# Patient Record
Sex: FEMALE | Race: AMERICAN INDIAN OR ALASKA NATIVE
[De-identification: names, ages, dates, MRNs, and addresses within clinical notes are randomized per-mention and may not be internally consistent; named-entity substitution may affect disease eponyms.]

---

## 2017-07-13 ENCOUNTER — HOSPITAL ENCOUNTER (OUTPATIENT)
Dept: HOSPITAL 5 - GIO | Age: 52
Discharge: HOME | End: 2017-07-13
Attending: INTERNAL MEDICINE
Payer: COMMERCIAL

## 2017-07-13 VITALS — SYSTOLIC BLOOD PRESSURE: 168 MMHG | DIASTOLIC BLOOD PRESSURE: 90 MMHG

## 2017-07-13 DIAGNOSIS — D12.5: ICD-10-CM

## 2017-07-13 DIAGNOSIS — Z12.11: Primary | ICD-10-CM

## 2017-07-13 DIAGNOSIS — K64.8: ICD-10-CM

## 2017-07-13 DIAGNOSIS — Z98.890: ICD-10-CM

## 2017-07-13 DIAGNOSIS — Z79.899: ICD-10-CM

## 2017-07-13 DIAGNOSIS — I10: ICD-10-CM

## 2017-07-13 DIAGNOSIS — E66.01: ICD-10-CM

## 2017-07-13 DIAGNOSIS — Z90.710: ICD-10-CM

## 2017-07-13 PROCEDURE — 45385 COLONOSCOPY W/LESION REMOVAL: CPT

## 2017-07-13 PROCEDURE — 88305 TISSUE EXAM BY PATHOLOGIST: CPT

## 2017-07-13 NOTE — ANESTHESIA CONSULTATION
Anesthesia Consult and Med Hx


Date of service: 07/13/17





- Airway


Anesthetic Teeth Evaluation: Good, Crowns (BOTTOM RIGHT)


ROM Head & Neck: Adequate


Mental/Hyoid Distance: Adequate


Mallampati Class: Class II


Intubation Access Assessment: Probably Good





- Pulmonary Exam


CTA: Yes





- Cardiac Exam


Cardiac Exam: RRR





- Pre-Operative Health Status


ASA Pre-Surgery Classification: ASA3


Proposed Anesthetic Plan: MAC





- Pulmonary


Hx Smoking: No


Hx Asthma: No


Hx Sleep Apnea: No





- Cardiovascular System


Hx Hypertension: Yes





- Central Nervous System


Hx Seizures: No


CVA: No





- Endocrine


Hx Renal Disease: No


Hx Liver Disease: No


Hx Thyroid Disease: No





- Other Systems


Hx Obesity: Yes (MORBID)

## 2017-07-13 NOTE — SHORT STAY SUMMARY
Short Stay Documentation





- Allergies and Medications


Current Medications: 


 Allergies





No Known Allergies Allergy (Verified 07/13/17 08:17)


 





 Home Medications











 Medication  Instructions  Recorded  Confirmed  Last Taken  Type


 


Hydrochlorothiazide 1 tab PO DAILY 07/12/17 07/13/17 07/13/17 History


 


Lisinopril 1 tab PO DAILY 07/12/17 07/13/17 07/13/17 History


 


Verapamil 1 tab PO DAILY 07/12/17 07/13/17 07/13/17 History








Active Medications





Sodium Chloride (Nacl 0.9% 1000 Ml)  1,000 mls @ 50 mls/hr IV AS DIRECT MICHAEL


   Last Admin: 07/13/17 08:18 Dose:  50 mls/hr











- Physical exam


Rectal Exam: tenderness





- Brief post op/procedure progress note


Date of procedure: 07/13/17


Pre-op diagnosis: Colon cancer screening


Post-op diagnosis: same (1. Colon polyp 2. Poor prep  2. Internal hemorrhoids)


Procedure: 


 Colonoscopy with snare polypectomy and control of bleeding with Endoclip





Anesthesia: MAC


Findings: 





as above


Surgeon: DWAYNE BRYSON


Estimated blood loss: none


Pathology: list (1Rectosigmoid polyp)


Condition: stable





- Disposition


Condition at discharge: Stable


Disposition: DC-01 TO HOME OR SELFCARE





Short Stay Discharge Plan


Activity: no restrictions


Weight Bearing Status: Full Weight Bearing


Diet: regular


Follow up with: 


JEIMY SANTA III, APRN-BC [Primary Care Provider] - 7 Days

## 2018-02-01 ENCOUNTER — HOSPITAL ENCOUNTER (INPATIENT)
Dept: HOSPITAL 5 - ED | Age: 53
LOS: 1 days | Discharge: HOME | DRG: 311 | End: 2018-02-02
Attending: INTERNAL MEDICINE | Admitting: INTERNAL MEDICINE
Payer: COMMERCIAL

## 2018-02-01 DIAGNOSIS — I24.9: Primary | ICD-10-CM

## 2018-02-01 DIAGNOSIS — Z82.49: ICD-10-CM

## 2018-02-01 DIAGNOSIS — E66.9: ICD-10-CM

## 2018-02-01 DIAGNOSIS — J45.909: ICD-10-CM

## 2018-02-01 DIAGNOSIS — I10: ICD-10-CM

## 2018-02-01 LAB
ALBUMIN SERPL-MCNC: 4 G/DL (ref 3.9–5)
ALT SERPL-CCNC: 25 UNITS/L (ref 7–56)
APTT BLD: 25.7 SEC. (ref 24.2–36.6)
BASOPHILS # (AUTO): 0 K/MM3 (ref 0–0.1)
BASOPHILS # (AUTO): 0.1 K/MM3 (ref 0–0.1)
BASOPHILS NFR BLD AUTO: 0.3 % (ref 0–1.8)
BASOPHILS NFR BLD AUTO: 0.5 % (ref 0–1.8)
BILIRUB DIRECT SERPL-MCNC: 0.4 MG/DL (ref 0–0.2)
BUN SERPL-MCNC: 11 MG/DL (ref 7–17)
BUN SERPL-MCNC: 13 MG/DL (ref 7–17)
BUN/CREAT SERPL: 18 %
BUN/CREAT SERPL: 22 %
CALCIUM SERPL-MCNC: 8.7 MG/DL (ref 8.4–10.2)
CALCIUM SERPL-MCNC: 9.4 MG/DL (ref 8.4–10.2)
EOSINOPHIL # BLD AUTO: 0 K/MM3 (ref 0–0.4)
EOSINOPHIL # BLD AUTO: 0.1 K/MM3 (ref 0–0.4)
EOSINOPHIL NFR BLD AUTO: 0.4 % (ref 0–4.3)
EOSINOPHIL NFR BLD AUTO: 1.1 % (ref 0–4.3)
HCT VFR BLD CALC: 39.9 % (ref 30.3–42.9)
HCT VFR BLD CALC: 41.3 % (ref 30.3–42.9)
HDLC SERPL-MCNC: 53 MG/DL (ref 40–59)
HEMOLYSIS INDEX: 10
HEMOLYSIS INDEX: 28
HGB BLD-MCNC: 12.7 GM/DL (ref 10.1–14.3)
HGB BLD-MCNC: 13.3 GM/DL (ref 10.1–14.3)
INR PPP: 0.9 (ref 0.87–1.13)
LYMPHOCYTES # BLD AUTO: 1.9 K/MM3 (ref 1.2–5.4)
LYMPHOCYTES # BLD AUTO: 2.2 K/MM3 (ref 1.2–5.4)
LYMPHOCYTES NFR BLD AUTO: 18.4 % (ref 13.4–35)
LYMPHOCYTES NFR BLD AUTO: 24.1 % (ref 13.4–35)
MAGNESIUM SERPL-MCNC: 1.8 MG/DL (ref 1.7–2.3)
MCH RBC QN AUTO: 27 PG (ref 28–32)
MCH RBC QN AUTO: 27 PG (ref 28–32)
MCHC RBC AUTO-ENTMCNC: 32 % (ref 30–34)
MCHC RBC AUTO-ENTMCNC: 32 % (ref 30–34)
MCV RBC AUTO: 84 FL (ref 79–97)
MCV RBC AUTO: 84 FL (ref 79–97)
MONOCYTES # (AUTO): 0.6 K/MM3 (ref 0–0.8)
MONOCYTES # (AUTO): 0.7 K/MM3 (ref 0–0.8)
MONOCYTES % (AUTO): 6.2 % (ref 0–7.3)
MONOCYTES % (AUTO): 6.3 % (ref 0–7.3)
PLATELET # BLD: 263 K/MM3 (ref 140–440)
PLATELET # BLD: 272 K/MM3 (ref 140–440)
RBC # BLD AUTO: 4.76 M/MM3 (ref 3.65–5.03)
RBC # BLD AUTO: 4.89 M/MM3 (ref 3.65–5.03)

## 2018-02-01 PROCEDURE — A9502 TC99M TETROFOSMIN: HCPCS

## 2018-02-01 PROCEDURE — 83735 ASSAY OF MAGNESIUM: CPT

## 2018-02-01 PROCEDURE — 93010 ELECTROCARDIOGRAM REPORT: CPT

## 2018-02-01 PROCEDURE — 84484 ASSAY OF TROPONIN QUANT: CPT

## 2018-02-01 PROCEDURE — 78452 HT MUSCLE IMAGE SPECT MULT: CPT

## 2018-02-01 PROCEDURE — 71045 X-RAY EXAM CHEST 1 VIEW: CPT

## 2018-02-01 PROCEDURE — 85610 PROTHROMBIN TIME: CPT

## 2018-02-01 PROCEDURE — 93306 TTE W/DOPPLER COMPLETE: CPT

## 2018-02-01 PROCEDURE — 80074 ACUTE HEPATITIS PANEL: CPT

## 2018-02-01 PROCEDURE — 85730 THROMBOPLASTIN TIME PARTIAL: CPT

## 2018-02-01 PROCEDURE — 80061 LIPID PANEL: CPT

## 2018-02-01 PROCEDURE — 93005 ELECTROCARDIOGRAM TRACING: CPT

## 2018-02-01 PROCEDURE — 80048 BASIC METABOLIC PNL TOTAL CA: CPT

## 2018-02-01 PROCEDURE — 83880 ASSAY OF NATRIURETIC PEPTIDE: CPT

## 2018-02-01 PROCEDURE — 85025 COMPLETE CBC W/AUTO DIFF WBC: CPT

## 2018-02-01 PROCEDURE — 36415 COLL VENOUS BLD VENIPUNCTURE: CPT

## 2018-02-01 PROCEDURE — 93017 CV STRESS TEST TRACING ONLY: CPT

## 2018-02-01 NOTE — HISTORY AND PHYSICAL REPORT
History of Present Illness


Chief complaint: 


Chest pain





History of present illness: 


53 YO Female with MO, HTN, Asthma, Menorrhagia, Anemia, Uterine Fibroids 

presents to ED for evaluation. Pt states that she has experienced pain in her 

chest today. Pt states that the pain is 9/10, Substernal, worse with exertion, 

relieved with rest, nonradiating, relieved with nitro. Pt denies fever, chills, 

palpitations, NVD, Prolonged travel/immobility, Leg swelling, calf pain, 

Individual/family history of DVT/PE, hemoptysis, trauma, unintentional weight 

loss, night sweats. Pt seen and evaluated in ED and found to have symptoms 

consistent with ACS. PT admitted to telemetry, cardiology consulted in ED. 








Past History


Past Medical History: hypertension, other (obesity,)


Past Surgical History: , tonsillectomy


Social history: single.  denies: smoking, alcohol abuse, prescription drug abuse


Family history: CAD, hypertension





Medications and Allergies


 Allergies











Allergy/AdvReac Type Severity Reaction Status Date / Time


 


No Known Allergies Allergy   Verified 14 02:57











 Home Medications











 Medication  Instructions  Recorded  Confirmed  Last Taken  Type


 


Hydrochlorothiazide 25 mg PO DAILY 14 Unknown History


 


Lisinopril [Zestril] 20 mg PO QDAY 14 Unknown History


 


Verapamil [Calan] 240 mg PO QDAY 14 Unknown History


 


Ascorbic Acid [Vitamin C with Akua 500 mg PO DAILY 18 Unknown 

History





Hips]     


 


Om3/Dha/Epa/Cod Liver Oil/A/D3 1 each PO DAILY 18 Unknown History





[Cod Liver Oil Softgel]     














Review of Systems


Constitutional: no weight gain, no fever, no chills, no sweats


Ears, nose, mouth and throat: no ear pain, no ear discharge, no tinnitis, no 

decreased hearing, no nose pain, no nasal congestion


Breasts: no change in shape, no swelling, no mass


Cardiovascular: chest pain, no orthopnea, no palpitations, no lightheadedness, 

no paroxysmal nocturnal dyspnea, no leg edema


Respiratory: no cough, no cough with sputum, no excessive sputum, no hemoptysis


Gastrointestinal: no nausea, no vomiting, no diarrhea, no constipation


Genitourinary Female: no pelvic pain, no flank pain, no menorrhagia, no dysuria

, no urinary frequency, no urgency


Rectal: no pain, no incontinence, no bleeding


Musculoskeletal: no neck stiffness, no neck pain, no shooting arm pain, no arm 

numbness/tingling, no low back pain, no shooting leg pain


Integumentary: no rash, no pruritis, no redness, no sores, no wounds, no 

jaundice, no boils


Neurological: no paralysis, no weakness, no parathesias, no numbness, no 

tingling, no seizures, no syncope


Psychiatric: no anxiety, no memory loss, no change in sleep habits, no sleep 

disturbances, no insomnia, no hypersomnia, no change in appetite


Endocrine: no cold intolerance, no heat intolerance, no polyphagia, no 

excessive thirst, no polydipsia, no polyuria, no nocturia


Hematologic/Lymphatic: no easy bruising, no easy bleeding, no lymphadenopathy, 

no lymphedema


Allergic/Immunologic: no urticaria, no allergic rhinitis, no wheezing, no 

persistent infections, no anaphylaxis





Exam





- Constitutional


Vitals: 


 











Temp Pulse Resp BP Pulse Ox


 


 98.1 F   80   16   167/86   99 


 


 18 11:10  18 12:02  18 11:25  18 12:02  18 11:25











General appearance: Present: mild distress, obese





- EENT


Eyes: Present: PERRL


ENT: hearing intact, clear oral mucosa





- Neck


Neck: Present: supple, normal ROM





- Respiratory


Respiratory effort: normal


Respiratory: bilateral: CTA





- Cardiovascular


Heart Sounds: Present: S1 & S2.  Absent: rub, click





- Extremities


Extremities: pulses symmetrical, No edema


Peripheral Pulses: within normal limits





- Abdominal


General gastrointestinal: Present: soft, non-tender, non-distended, normal 

bowel sounds


Female genitourinary: Present: normal





- Integumentary


Integumentary: Present: clear, warm, dry





- Musculoskeletal


Musculoskeletal: gait normal, strength equal bilaterally





- Psychiatric


Psychiatric: appropriate mood/affect, intact judgment & insight





- Neurologic


Neurologic: CNII-XII intact, moves all extremities





Results





- Labs


CBC & Chem 7: 


 18 14:28





 18 14:28


Labs: 


 Abnormal lab results











  18 Range/Units





  11:18 11:18 11:18 


 


MCH  27 L    (28-32)  pg


 


Potassium   3.0 L   (3.6-5.0)  mmol/L


 


Chloride   96.5 L   ()  mmol/L


 


Creatinine   0.6 L   (0.7-1.2)  mg/dL


 


Glucose   105 H   ()  mg/dL


 


Direct Bilirubin    0.4 H  (0-0.2)  mg/dL


 


AST    64 H  (5-40)  units/L














Assessment and Plan





- Patient Problems


(1) ACS (acute coronary syndrome)


Current Visit: Yes   Status: Acute   


Plan to address problem: 


Admit to telemetry, serial cardiac enzymes, ekg, telemetry, echo, stress test, 

cardiology consulted, 








(2) Obesity


Current Visit: Yes   Status: Acute   


Qualifiers: 


   Body mass index: BMI 39.0-39.9 


Plan to address problem: 


Balanced diet, increased physical activity, outpatient bariatric surgery 

evaluation. 








(3) Uncontrolled hypertension


Current Visit: Yes   Status: Acute   


Plan to address problem: 


monitor bp q shift, hydralazine prn, continue medical management








(4) Asthma


Current Visit: Yes   Status: Acute   


Qualifiers: 


   Asthma severity: mild   Asthma persistence: intermittent   Asthma 

complication type: uncomplicated   Qualified Code(s): J45.20 - Mild 

intermittent asthma, uncomplicated   


Plan to address problem: 


Nebulizer therapy, supportive care, supplemental oxygen, continue medical 

management 








(5) DVT prophylaxis


Current Visit: Yes   Status: Acute

## 2018-02-01 NOTE — EMERGENCY DEPARTMENT REPORT
ED Chest Pain HPI





- General


Chief Complaint: Chest Pain


Stated Complaint: CHEST PAIN


Time Seen by Provider: 02/01/18 11:34


Source: patient, EMS


Mode of arrival: Stretcher


Limitations: No Limitations





- History of Present Illness


Initial Comments: 


Patient states that she was outside walking while she was at work when she 

developed substernal chest tightness she stated she did have some nausea but 

did not vomit like she did on a previous occasion about every 6 years ago when 

she had similar symptoms.  During that time she reports having a cardiac 

catheterization which did not lead to PCI.  She currently has a referral to a 

cardiologist at [facility] for evaluation of a "heart murmur".  She has not had any 

recurrent chest pain.  She has a BMI of greater than 40 and a brother with a 

history of myocardial infarction.  She states that she took her medicine for 

hypertension today.  EMS gave aspirin, nitroglycerin and IV fentanyl.  She 

states that her symptoms resolved after approximately 10 minutes.  She presents 

to the emergency department with persistent hypertension but again no chest 

pain.





MD Complaint: chest pain


-: Gradual


Onset: during exertion (while walking)


Pain Location: left chest


Pain Radiation: none


Severity: moderate, severe


Severity scale (0 -10): 9


Quality: pressure


Consistency: now resolved


Improves With: other (the EMS cocktail as described above)


Worsens With: other (occurred during exertion but patient stopped walking)


re: nausea, dyspnea.  denies: vomting, diaphoresis


Treatments Prior to Arrival: nitroglycerin


Aspirin use within the Past 7 Days: (0) No





- Related Data


On Oral Contraceptives: No


 Home Medications











 Medication  Instructions  Recorded  Confirmed  Last Taken


 


Hydrochlorothiazide 25 mg PO DAILY 01/28/14 01/29/14 Unknown


 


Lisinopril [Zestril] 20 mg PO QDAY 01/28/14 01/28/14 Unknown


 


Verapamil [Calan] 240 mg PO BID 01/28/14 01/29/14 Unknown


 


Ascorbic Acid [Vitamin C with Akua 500 mg PO DAILY 02/01/18 02/01/18 Unknown





Hips]    


 


Om3/Dha/Epa/Cod Liver Oil/A/D3 1 each PO DAILY 02/01/18 02/01/18 Unknown





[Cod Liver Oil Softgel]    











 Allergies











Allergy/AdvReac Type Severity Reaction Status Date / Time


 


No Known Allergies Allergy   Verified 01/29/14 02:57














Heart Score





- HEART Score


History: Moderately suspicious


EKG: Non-specific


Age: 45-65


Risk factors: > 3 risk factors or hx of atherosclerotic disease


Troponin: < normal limit


HEART Score: 5





- Critical Actions


Critical Actions: 4-6 pts:12-16.6% risk of adverse cardiac event. Should be 

admitted





ED Review of Systems


ROS: 


Stated complaint: CHEST PAIN


Other details as noted in HPI





Constitutional: denies: chills, fever


Eyes: denies: eye pain, eye discharge, vision change


ENT: denies: ear pain, throat pain


Respiratory: shortness of breath (resolved).  denies: cough, wheezing


Cardiovascular: chest pain (resolved).  denies: palpitations


Endocrine: no symptoms reported


Gastrointestinal: denies: abdominal pain, nausea, diarrhea


Genitourinary: denies: urgency, dysuria, discharge


Musculoskeletal: denies: back pain, joint swelling, arthralgia


Skin: denies: rash, lesions


Neurological: denies: headache, weakness, paresthesias


Psychiatric: denies: anxiety, depression


Hematological/Lymphatic: denies: easy bleeding, easy bruising





ED Past Medical Hx





- Past Medical History


Previous Medical History?: Yes


Hx Hypertension: Yes


Hx Congestive Heart Failure: No


Hx Diabetes: No


Hx Asthma: Yes


Hx COPD: No


Additional medical history: Anemia, menorrhagia, uterine fibroids, heart murmur





- Social History


Smoking Status: Never Smoker


Substance Use Type: None





- Medications


Home Medications: 


 Home Medications











 Medication  Instructions  Recorded  Confirmed  Last Taken  Type


 


Hydrochlorothiazide 25 mg PO DAILY 01/28/14 01/29/14 Unknown History


 


Lisinopril [Zestril] 20 mg PO QDAY 01/28/14 01/28/14 Unknown History


 


Verapamil [Calan] 240 mg PO BID 01/28/14 01/29/14 Unknown History


 


Ascorbic Acid [Vitamin C with Akua 500 mg PO DAILY 02/01/18 02/01/18 Unknown 

History





Hips]     


 


Om3/Dha/Epa/Cod Liver Oil/A/D3 1 each PO DAILY 02/01/18 02/01/18 Unknown History





[Cod Liver Oil Softgel]     














ED Physical Exam





- General


Limitations: No Limitations


General appearance: alert, in no apparent distress, obese





- Head


Head exam: Present: atraumatic, normocephalic





- Eye


Eye exam: Present: normal appearance





- ENT


ENT exam: Present: mucous membranes moist





- Neck


Neck exam: Present: normal inspection.  Absent: tenderness, meningismus





- Respiratory


Respiratory exam: Present: normal lung sounds bilaterally.  Absent: respiratory 

distress





- Cardiovascular


Cardiovascular Exam: Present: regular rate, normal rhythm.  Absent: systolic 

murmur, diastolic murmur, rubs, gallop





- GI/Abdominal


GI/Abdominal exam: Present: soft, normal bowel sounds.  Absent: distended, 

tenderness, guarding, rebound, rigid





- Extremities Exam


Extremities exam: Present: normal inspection





- Back Exam


Back exam: Present: normal inspection





- Neurological Exam


Neurological exam: Present: alert, oriented X3, CN II-XII intact.  Absent: 

motor sensory deficit





- Psychiatric


Psychiatric exam: Present: normal affect, normal mood





- Skin


Skin exam: Present: warm, dry, intact, normal color.  Absent: rash





ED Course


 Vital Signs











  02/01/18 02/01/18 02/01/18





  11:04 11:10 11:25


 


Temperature 97.7 F 98.1 F 


 


Pulse Rate 90 85 


 


Respiratory 18 16 16





Rate   


 


Blood Pressure 173/93  


 


Blood Pressure  172/94 





[Right]   


 


O2 Sat by Pulse 98 99 99





Oximetry   














  02/01/18





  12:02


 


Temperature 


 


Pulse Rate 80


 


Respiratory 





Rate 


 


Blood Pressure 167/86


 


Blood Pressure 





[Right] 


 


O2 Sat by Pulse 





Oximetry 














- Reevaluation(s)


Reevaluation #1: 


Nitro paste.  Patient remained stable.  Discussed with Dr. Harmon.  Admitted to 

his service for further care and evaluation and appropriate consultations.  

Discussed with [facility] doctor Agarwal authorized admission to our facility.


02/01/18 12:37








ED Medical Decision Making





- Lab Data


Result diagrams: 


 02/01/18 11:18





 02/01/18 11:18








 Laboratory Results - last 24 hr











  02/01/18





  11:18


 


WBC  9.3


 


RBC  4.76


 


Hgb  12.7


 


Hct  39.9


 


MCV  84


 


MCH  27 L


 


MCHC  32


 


RDW  14.7


 


Plt Count  272


 


Lymph % (Auto)  24.1


 


Mono % (Auto)  6.3


 


Eos % (Auto)  1.1


 


Baso % (Auto)  0.5


 


Lymph #  2.2


 


Mono #  0.6


 


Eos #  0.1


 


Baso #  0.1


 


Seg Neutrophils %  68.0


 


Seg Neutrophils #  6.3














- EKG Data


-: EKG Interpreted by Me


EKG shows normal: sinus rhythm, axis, intervals


Rate: normal





- EKG Data


Interpretation: other (poor R-wave progression anteriorly may be consistent 

with old anterior wall.  Slight J-point elevation in V2.  Prehospital EKG is 2 

PVCs.  No acute ischemic changes (nonspecific).)





- Radiology Data


interpreted by me: 


Patient has no acute process





Critical care attestation.: 


If time is entered above; I have spent that time in minutes in the direct care 

of this critically ill patient, excluding procedure time.








ED Disposition


Clinical Impression: 


 Uncontrolled hypertension





Chest pain


Qualifiers:


 Chest pain type: unspecified Qualified Code(s): R07.9 - Chest pain, unspecified





Disposition: DC-09 OP ADMIT IP TO THIS HOSP


Is pt being admited?: Yes


Does the pt Need Aspirin: No (already given)


Condition: Stable


Instructions:  Chest Pain (ED), Hypertension (ED)


Referrals: 


PRIMARY CARE,MD [Primary Care Provider] - 3-5 Days

## 2018-02-01 NOTE — XRAY REPORT
PORTABLE CHEST:



Hypertension.

An AP portable view of the chest demonstrates a normal cardiac

contour considering the limits of this technique.  The lungs are clear 

with no evidence of infiltrate, fluid or failure.



IMPRESSION:

Normal portable chest.

## 2018-02-02 VITALS — SYSTOLIC BLOOD PRESSURE: 151 MMHG | DIASTOLIC BLOOD PRESSURE: 85 MMHG

## 2018-02-02 NOTE — CONSULTATION
History of Present Illness


Consult date: 18


Consult reason: chest pain


History of present illness: 





51 YO Female with HTN, Asthma reports  pain in her chest today. Pt states that 

the pain is 9/10, Substernal, worse with exertion, relieved with rest, 

nonradiating, relieved with nitro.  





Past History


Past Medical History: hypertension, other (obesity,)


Past Surgical History: , tonsillectomy


Social history: single.  denies: smoking, alcohol abuse, prescription drug abuse


Family history: CAD, hypertension





Medications and Allergies


 Allergies











Allergy/AdvReac Type Severity Reaction Status Date / Time


 


No Known Allergies Allergy   Verified 14 02:57











 Home Medications











 Medication  Instructions  Recorded  Confirmed  Last Taken  Type


 


Hydrochlorothiazide 25 mg PO DAILY 14 Unknown History


 


Lisinopril [Zestril] 20 mg PO QDAY 14 Unknown History


 


Verapamil [Calan] 240 mg PO QDAY 14 Unknown History


 


Ascorbic Acid [Vitamin C with Akua 500 mg PO DAILY 18 Unknown 

History





Hips]     


 


Om3/Dha/Epa/Cod Liver Oil/A/D3 1 each PO DAILY 18 Unknown History





[Cod Liver Oil Softgel]     











Active Meds: 


Active Medications





Morphine Sulfate (Morphine)  2 mg IV Q4H PRN


   PRN Reason: For pain/fever/headache


   Last Admin: 18 01:35 Dose:  2 mg


Nitroglycerin (Nitrostat)  0.4 mg SL Q5M PRN


   PRN Reason: Chest Pain


   Last Admin: 18 00:50 Dose:  0.4 mg


Sodium Chloride (Sodium Chloride Flush Syringe 10 Ml)  10 ml IV PRN PRN


   PRN Reason: LINE FLUSH











Review of Systems


All systems: negative





Physical Examination


 Vital Signs











Pulse Resp


 


 94 H  16 


 


 18 10:57  18 10:57











General appearance: no acute distress


HEENT: Positive: PERRL


Neck: Positive: neck supple


Cardiac: Positive: Reg Rate and Rhythm


Lungs: Positive: Normal Exam


Neuro: Positive: Grossly Intact


Abdomen: Positive: Soft


Extremities: Absent: edema





Results





 18 14:28





 18 14:28


 Cardiac Enzymes











  18 Range/Units





  11:18 


 


AST  64 H  (5-40)  units/L








 Coagulation











  18 Range/Units





  11:18 


 


PT  12.6  (12.2-14.9)  Sec.


 


INR  0.90  (0.87-1.13)  


 


APTT  25.7  (24.2-36.6)  Sec.








 Lipids











  18 Range/Units





  14:28 


 


Triglycerides  112  (2-149)  mg/dL


 


Cholesterol  195  ()  mg/dL


 


HDL Cholesterol  53  (40-59)  mg/dL


 


Cholesterol/HDL Ratio  3.67  %








 CBC











  18 Range/Units





  14:28 


 


WBC  10.5  (4.5-11.0)  K/mm3


 


RBC  4.89  (3.65-5.03)  M/mm3


 


Hgb  13.3  (10.1-14.3)  gm/dl


 


Hct  41.3  (30.3-42.9)  %


 


Plt Count  263  (140-440)  K/mm3


 


Lymph #  1.9  (1.2-5.4)  K/mm3


 


Mono #  0.7  (0.0-0.8)  K/mm3


 


Eos #  0.0  (0.0-0.4)  K/mm3


 


Baso #  0.0  (0.0-0.1)  K/mm3








 Comprehensive Metabolic Panel











  18 Range/Units





  11:18 14:28 


 


Sodium   141  (137-145)  mmol/L


 


Potassium   3.4 L  (3.6-5.0)  mmol/L


 


Chloride   97.9 L  ()  mmol/L


 


Carbon Dioxide   30  (22-30)  mmol/L


 


BUN   11  (7-17)  mg/dL


 


Creatinine   0.6 L  (0.7-1.2)  mg/dL


 


Glucose   99  ()  mg/dL


 


Calcium   9.4  (8.4-10.2)  mg/dL


 


Direct Bilirubin  0.4 H   (0-0.2)  mg/dL


 


Indirect Bilirubin  0.4   mg/dL


 


AST  64 H   (5-40)  units/L


 


ALT  25   (7-56)  units/L


 


Alkaline Phosphatase  114   ()  units/L


 


Total Protein  6.9   (6.3-8.2)  g/dL


 


Albumin  4.0   (3.9-5)  g/dL














EKG interpretations





- Telemetry


EKG Rhythm: Sinus Rhythm





Assessment and Plan





Chest pain


   No ischemia by MPI, low risk study


   Negative Liz


   No ischemic ECG findings, occasional PVCs


   Normal CXR


Systemic Hypertension - uncontrolled





Recommendations:





No further cardiac work-up needed


Resume all blood pressure medications

## 2018-02-02 NOTE — DISCHARGE SUMMARY
Providers





- Providers


Date of Admission: 


02/01/18 13:54





Date of discharge: 02/02/18


Attending physician: 


JR GARCIA





 





02/01/18


Consult to Cardiac Rehabilitation [CONS] Routine 


   Reason For Exam: Phase I





02/01/18 14:15


Consult to Cardiology [CONS] Routine 


   Consulting Provider: MARCIO POE


   Reason For Exam: acs











Primary care physician: 


SHARI OSWALD








Hospitalization


Condition: Stable


Hospital course: 





Assessment and Plan





- Patient Problems


(1) ACS (acute coronary syndrome)


Current Visit: Yes   Status: Acute   


Plan to address problem: 


MPI negative


will d/c home








(2) Obesity


Current Visit: Yes   Status: Acute   


Qualifiers: 


   Body mass index: BMI 39.0-39.9 


Plan to address problem: 


Balanced diet, increased physical activity, outpatient bariatric surgery 

evaluation. 








(3) Uncontrolled hypertension


Current Visit: Yes   Status: Acute   


Plan to address problem: 


improved


D/c on Losartan 100 qd and Verapamil 240 mg po qd








(4) Asthma


Current Visit: Yes   Status: Acute   


Qualifiers: 


   Asthma severity: mild   Asthma persistence: intermittent   Asthma 

complication type: uncomplicated   Qualified Code(s): J45.20 - Mild 

intermittent asthma, uncomplicated   


Plan to address problem: 


Nebulizer therapy, supportive care, supplemental oxygen, continue medical 

management 








(5) DVT prophylaxis


Current Visit: Yes   Status: Acute   





Disposition: DC-01 TO HOME OR SELFCARE


Time spent for discharge: 31 min





Core Measure Documentation





- Palliative Care


Palliative Care/ Comfort Measures: Not Applicable





- Core Measures


Any of the following diagnoses?: none





Exam





- Constitutional


Vitals: 


 











Temp Pulse Resp BP Pulse Ox


 


 98.5 F   84   18   151/85   95 


 


 02/02/18 16:00  02/02/18 16:00  02/02/18 16:00  02/02/18 16:00  02/02/18 16:00











General appearance: Present: no acute distress, well-nourished





- EENT


Eyes: Present: PERRL


ENT: hearing intact, clear oral mucosa





- Neck


Neck: Present: supple, normal ROM





- Respiratory


Respiratory effort: normal


Respiratory: bilateral: CTA





- Cardiovascular


Heart Sounds: Present: S1 & S2.  Absent: rub, click





- Extremities


Extremities: pulses symmetrical, No edema


Peripheral Pulses: within normal limits





- Abdominal


General gastrointestinal: Present: soft, non-tender, non-distended, normal 

bowel sounds


Female genitourinary: Present: normal





- Integumentary


Integumentary: Present: clear, warm, dry





- Musculoskeletal


Musculoskeletal: gait normal, strength equal bilaterally





- Psychiatric


Psychiatric: appropriate mood/affect, intact judgment & insight





- Neurologic


Neurologic: CNII-XII intact, moves all extremities





- Allied Health


Allied health notes reviewed: nursing, case management





Plan


Activity: no restrictions


Diet: low salt


Follow up with: 


PRIMARY CARE,MD [Referring] - 3-5 Days

## 2018-02-03 NOTE — TREADMILL REPORT
INDICATION:  Chest pain.



ORDERING PHYSICIAN:  Claudia Fletcher MD.



FINDINGS:  There is no scintigraphic evidence of myocardial ischemia.  The left

ventricle is normal in size and systolic function.  The left ventricular

ejection fraction is measured at 65%.  Normal wall motion and wall thickening is

noted on gated imaging.



CONCLUSION:  Normal perfusion scan.





DD: 02/02/2018 12:11

DT: 02/03/2018 07:01

JOB# [id number]  [id number]

AKJANICE/NTS